# Patient Record
Sex: FEMALE | Race: WHITE | NOT HISPANIC OR LATINO | Employment: FULL TIME | URBAN - METROPOLITAN AREA
[De-identification: names, ages, dates, MRNs, and addresses within clinical notes are randomized per-mention and may not be internally consistent; named-entity substitution may affect disease eponyms.]

---

## 2020-03-15 ENCOUNTER — HOSPITAL ENCOUNTER (EMERGENCY)
Facility: CLINIC | Age: 25
Discharge: HOME | End: 2020-03-15
Attending: EMERGENCY MEDICINE | Admitting: EMERGENCY MEDICINE
Payer: COMMERCIAL

## 2020-03-15 VITALS
HEART RATE: 86 BPM | TEMPERATURE: 97.7 F | SYSTOLIC BLOOD PRESSURE: 107 MMHG | RESPIRATION RATE: 12 BRPM | OXYGEN SATURATION: 97 % | DIASTOLIC BLOOD PRESSURE: 64 MMHG

## 2020-03-15 DIAGNOSIS — F10.921 ALCOHOL INTOXICATION WITH DELIRIUM (H): ICD-10-CM

## 2020-03-15 DIAGNOSIS — R11.2 NON-INTRACTABLE VOMITING WITH NAUSEA, UNSPECIFIED VOMITING TYPE: ICD-10-CM

## 2020-03-15 LAB
ETHANOL SERPL-MCNC: 0.2 G/DL
HCG SERPL QL: NEGATIVE

## 2020-03-15 PROCEDURE — 80320 DRUG SCREEN QUANTALCOHOLS: CPT | Performed by: EMERGENCY MEDICINE

## 2020-03-15 PROCEDURE — 99284 EMERGENCY DEPT VISIT MOD MDM: CPT | Mod: 25

## 2020-03-15 PROCEDURE — 84703 CHORIONIC GONADOTROPIN ASSAY: CPT | Performed by: EMERGENCY MEDICINE

## 2020-03-15 PROCEDURE — 96374 THER/PROPH/DIAG INJ IV PUSH: CPT

## 2020-03-15 PROCEDURE — 25800030 ZZH RX IP 258 OP 636: Performed by: EMERGENCY MEDICINE

## 2020-03-15 PROCEDURE — 96361 HYDRATE IV INFUSION ADD-ON: CPT

## 2020-03-15 PROCEDURE — 25000128 H RX IP 250 OP 636: Performed by: EMERGENCY MEDICINE

## 2020-03-15 RX ORDER — ONDANSETRON 2 MG/ML
4 INJECTION INTRAMUSCULAR; INTRAVENOUS ONCE
Status: COMPLETED | OUTPATIENT
Start: 2020-03-15 | End: 2020-03-15

## 2020-03-15 RX ORDER — ONDANSETRON 4 MG/1
4 TABLET, ORALLY DISINTEGRATING ORAL EVERY 6 HOURS PRN
Qty: 10 TABLET | Refills: 0 | Status: SHIPPED | OUTPATIENT
Start: 2020-03-15 | End: 2020-03-18

## 2020-03-15 RX ADMIN — SODIUM CHLORIDE 1000 ML: 9 INJECTION, SOLUTION INTRAVENOUS at 01:48

## 2020-03-15 RX ADMIN — ONDANSETRON 4 MG: 2 INJECTION INTRAMUSCULAR; INTRAVENOUS at 01:48

## 2020-03-15 NOTE — ED PROVIDER NOTES
"  History     Chief Complaint:  Alcohol intoxication      HPI   Jamil Burrell is a 24 year old female who presents acutely intoxicated. Patient comes by EMS from a private residence where majority of occupants were also intoxicated. Patient had vomited and was unable to care for herself there. Here, patient denies any co-ingestion, but is on Prozac for depression and anxiety. She denies suicidal ideation, any injuries or trauma. Patient states that she has past medical history significant for \"Factor 2\" coagulation disorder.     Allergies:  No known drug allergies     Medications:    Prozac     Past Medical History:    Depression  Anxiety  \"Factor 2\" disorder    Past Surgical History:    History reviewed. No pertinent surgical history.     Family History:    History reviewed. No pertinent family history.      Social History:  Alcohol use: Yes  Patient presents alone.    Review of Systems   Unable to perform ROS: Mental status change     Physical Exam     Patient Vitals for the past 24 hrs:   BP Temp Temp src Pulse Resp SpO2   03/15/20 0337 -- -- -- -- 12 --   03/15/20 0330 107/64 -- -- 86 -- 97 %   03/15/20 0315 101/60 -- -- 81 -- 95 %   03/15/20 0300 98/55 -- -- 77 -- 95 %   03/15/20 0245 96/55 -- -- 78 -- 94 %   03/15/20 0235 -- -- -- -- -- 94 %   03/15/20 0230 98/61 -- -- 81 -- 93 %   03/15/20 0225 -- -- -- -- -- 93 %   03/15/20 0220 -- -- -- -- -- 92 %   03/15/20 0215 105/56 -- -- 76 -- 92 %   03/15/20 0205 -- -- -- -- -- 97 %   03/15/20 0200 114/71 -- -- 71 -- 100 %   03/15/20 0155 -- -- -- -- -- 99 %   03/15/20 0150 -- -- -- -- -- 98 %   03/15/20 0145 111/79 -- -- 106 -- 98 %   03/15/20 0140 119/67 -- -- 87 -- 100 %   03/15/20 0116 168/112 97.7  F (36.5  C) Oral 111 20 96 %     Physical Exam  Nursing note and vitals reviewed.  Constitutional: Intoxicated, slurring her speech.   HENT:   Mouth/Throat: Mucous membranes are dry  Eyes: Pupils are equal, round, and reactive to light. EOMI  Cardiovascular: " Tachycardic rate, regular rhythm and normal heart sounds.  No murmur.  Pulmonary/Chest: Effort normal and breath sounds normal. No respiratory distress. No wheezes.   Abdominal: Soft. Normal appearance and bowel sounds are normal. No distension. There is no tenderness.    Musculoskeletal: Normal range of motion.   Neurological: Alert. GCS 14.  Strength normal.   Skin: Skin is warm and dry. No rash noted.   Psychiatric: Normal mood and affect.      Emergency Department Course     Laboratory:  Laboratory findings were communicated with the patient who voiced understanding of the findings.    EtOH: 0.20    HCG: Negative    Interventions:  0148 - NS 1 L IV Bolus   0148 - Zofran 4 mg IV     Emergency Department Course:  The patient arrived in the emergency department via EMS.     0121: I performed an exam of the patient and obtained history, as documented above. Patient placed on an JEANMARIE.   Past medical records, nursing notes, and vitals reviewed.    IV inserted and blood drawn. This was sent to laboratory for testing, findings above.      0306: I rechecked the patient whose mother is here. Mother is comfortable taking the patient home. Findings and plan explained to the Patient and mother. Patient discharged home with instructions regarding supportive care, medications, and reasons to return. The importance of close follow-up was reviewed.    Impression & Plan     Medical Decision Making:  Jamil Burrell is a 24 year old female who presents from private residence after overindulging in alcohol. She is unable to care for herself. She denies drug use, suicidal ideation, symptoms of psychosis, or injuries. We will monitor her for safety. She is maintaining her airway without difficulty at this time. Plan for reassessment once sober.       Diagnosis:    ICD-10-CM    1. Alcohol intoxication with delirium (H)  F10.921    2. Non-intractable vomiting with nausea, unspecified vomiting type  R11.2      Disposition:  Discharged  to home.    Discharge Medications:   Details   ondansetron (ZOFRAN ODT) 4 MG ODT tab Take 1 tablet (4 mg) by mouth every 6 hours as needed for nausea, Disp-10 tablet,R-0, Local Print        Scribe Disclosure:  I, Clifton Sharma, am serving as a scribe at 1:19 AM on 3/15/2020 to document services personally performed by Josué Garcia MD= based on my observations and the provider's statements to me.      Clifton Sharma   3/15/2020   [facility] EMERGENCY DEPARTMENT     Josué Garcia MD  03/15/20 0632

## 2020-03-15 NOTE — ED AVS SNAPSHOT
[facility] Emergency Department  [address]  [facility] [address]  Phone:  [phone]  Fax:  [phone]                                    Jamil Burrell   MRN: [id number]    Department:  [facility] Emergency Department   Date of Visit:  3/15/2020           After Visit Summary Signature Page    I have received my discharge instructions, and my questions have been answered. I have discussed any challenges I see with this plan with the nurse or doctor.    ..........................................................................................................................................  Patient/Patient Representative Signature      ..........................................................................................................................................  Patient Representative Print Name and Relationship to Patient    ..................................................               ................................................  Date                                   Time    ..........................................................................................................................................  Reviewed by Signature/Title    ...................................................              ..............................................  Date                                               Time          22EPIC Rev 08/18

## 2020-03-15 NOTE — ED TRIAGE NOTES
Alcohol intoxication.  Pt friends called 911 because pt was difficult to rouse.  Pt and friends were poor historians d/t alcohol intoxication.  Pt somnolent and shivering.

## 2024-10-18 ENCOUNTER — HOSPITAL ENCOUNTER (EMERGENCY)
Facility: CLINIC | Age: 29
Discharge: HOME | End: 2024-10-19
Attending: EMERGENCY MEDICINE
Payer: COMMERCIAL

## 2024-10-18 DIAGNOSIS — R07.9 CHEST PAIN, UNSPECIFIED TYPE: ICD-10-CM

## 2024-10-18 DIAGNOSIS — F10.920 ALCOHOLIC INTOXICATION WITHOUT COMPLICATION (H): ICD-10-CM

## 2024-10-18 DIAGNOSIS — R82.90 ABNORMAL FINDING ON URINALYSIS: ICD-10-CM

## 2024-10-18 DIAGNOSIS — E87.6 HYPOKALEMIA: ICD-10-CM

## 2024-10-18 LAB — GLUCOSE BLDC GLUCOMTR-MCNC: 126 MG/DL (ref 70–99)

## 2024-10-18 PROCEDURE — 80048 BASIC METABOLIC PNL TOTAL CA: CPT | Performed by: EMERGENCY MEDICINE

## 2024-10-18 PROCEDURE — 99285 EMERGENCY DEPT VISIT HI MDM: CPT | Mod: 25

## 2024-10-18 PROCEDURE — 85027 COMPLETE CBC AUTOMATED: CPT | Performed by: EMERGENCY MEDICINE

## 2024-10-18 PROCEDURE — 83880 ASSAY OF NATRIURETIC PEPTIDE: CPT | Performed by: EMERGENCY MEDICINE

## 2024-10-18 PROCEDURE — 82962 GLUCOSE BLOOD TEST: CPT

## 2024-10-18 PROCEDURE — 84703 CHORIONIC GONADOTROPIN ASSAY: CPT | Performed by: EMERGENCY MEDICINE

## 2024-10-18 PROCEDURE — 36415 COLL VENOUS BLD VENIPUNCTURE: CPT | Performed by: EMERGENCY MEDICINE

## 2024-10-18 PROCEDURE — 84484 ASSAY OF TROPONIN QUANT: CPT | Performed by: EMERGENCY MEDICINE

## 2024-10-18 PROCEDURE — 82077 ASSAY SPEC XCP UR&BREATH IA: CPT | Performed by: EMERGENCY MEDICINE

## 2024-10-18 PROCEDURE — 85007 BL SMEAR W/DIFF WBC COUNT: CPT | Performed by: EMERGENCY MEDICINE

## 2024-10-18 RX ORDER — DEXTROAMPHETAMINE SACCHARATE, AMPHETAMINE ASPARTATE, DEXTROAMPHETAMINE SULFATE AND AMPHETAMINE SULFATE 1.25; 1.25; 1.25; 1.25 MG/1; MG/1; MG/1; MG/1
TABLET ORAL
COMMUNITY
Start: 2024-07-30

## 2024-10-19 ENCOUNTER — APPOINTMENT (OUTPATIENT)
Dept: CT IMAGING | Facility: CLINIC | Age: 29
End: 2024-10-19
Attending: EMERGENCY MEDICINE
Payer: COMMERCIAL

## 2024-10-19 VITALS
OXYGEN SATURATION: 100 % | TEMPERATURE: 98.5 F | RESPIRATION RATE: 18 BRPM | HEART RATE: 71 BPM | WEIGHT: 130 LBS | DIASTOLIC BLOOD PRESSURE: 79 MMHG | SYSTOLIC BLOOD PRESSURE: 113 MMHG

## 2024-10-19 LAB
ALBUMIN UR-MCNC: NEGATIVE MG/DL
ANION GAP SERPL CALCULATED.3IONS-SCNC: 21 MMOL/L (ref 7–15)
APPEARANCE UR: CLEAR
BACTERIA #/AREA URNS HPF: ABNORMAL /HPF
BASOPHILS # BLD MANUAL: 0 10E3/UL (ref 0–0.2)
BASOPHILS NFR BLD MANUAL: 0 %
BILIRUB UR QL STRIP: NEGATIVE
BUN SERPL-MCNC: 6.6 MG/DL (ref 6–20)
CALCIUM SERPL-MCNC: 8.9 MG/DL (ref 8.8–10.4)
CHLORIDE SERPL-SCNC: 102 MMOL/L (ref 98–107)
COLOR UR AUTO: ABNORMAL
CREAT SERPL-MCNC: 0.72 MG/DL (ref 0.51–0.95)
EGFRCR SERPLBLD CKD-EPI 2021: >90 ML/MIN/1.73M2
EOSINOPHIL # BLD MANUAL: 0 10E3/UL (ref 0–0.7)
EOSINOPHIL NFR BLD MANUAL: 0 %
ERYTHROCYTE [DISTWIDTH] IN BLOOD BY AUTOMATED COUNT: 13.1 % (ref 10–15)
ETHANOL SERPL-MCNC: 0.13 G/DL
GLUCOSE SERPL-MCNC: 119 MG/DL (ref 70–99)
GLUCOSE UR STRIP-MCNC: NEGATIVE MG/DL
HCG SERPL QL: NEGATIVE
HCO3 SERPL-SCNC: 17 MMOL/L (ref 22–29)
HCT VFR BLD AUTO: 41.8 % (ref 35–47)
HGB BLD-MCNC: 13.6 G/DL (ref 11.7–15.7)
HGB UR QL STRIP: NEGATIVE
HOLD SPECIMEN: NORMAL
HYALINE CASTS: 1 /LPF
KETONES UR STRIP-MCNC: NEGATIVE MG/DL
LEUKOCYTE ESTERASE UR QL STRIP: NEGATIVE
LYMPHOCYTES # BLD MANUAL: 5.6 10E3/UL (ref 0.8–5.3)
LYMPHOCYTES NFR BLD MANUAL: 43 %
MCH RBC QN AUTO: 29.4 PG (ref 26.5–33)
MCHC RBC AUTO-ENTMCNC: 32.5 G/DL (ref 31.5–36.5)
MCV RBC AUTO: 90 FL (ref 78–100)
MONOCYTES # BLD MANUAL: 0.1 10E3/UL (ref 0–1.3)
MONOCYTES NFR BLD MANUAL: 1 %
MUCOUS THREADS #/AREA URNS LPF: PRESENT /LPF
NEUTROPHILS # BLD MANUAL: 7.3 10E3/UL (ref 1.6–8.3)
NEUTROPHILS NFR BLD MANUAL: 56 %
NITRATE UR QL: POSITIVE
NT-PROBNP SERPL-MCNC: <36 PG/ML (ref 0–450)
PH UR STRIP: 5 [PH] (ref 5–7)
PLAT MORPH BLD: ABNORMAL
PLAT MORPH BLD: ABNORMAL
PLATELET # BLD AUTO: 497 10E3/UL (ref 150–450)
POTASSIUM SERPL-SCNC: 2.9 MMOL/L (ref 3.4–5.3)
RBC # BLD AUTO: 4.63 10E6/UL (ref 3.8–5.2)
RBC MORPH BLD: ABNORMAL
RBC MORPH BLD: ABNORMAL
RBC URINE: <1 /HPF
SODIUM SERPL-SCNC: 140 MMOL/L (ref 135–145)
SP GR UR STRIP: 1.01 (ref 1–1.03)
SQUAMOUS EPITHELIAL: <1 /HPF
TROPONIN T SERPL HS-MCNC: <6 NG/L
UROBILINOGEN UR STRIP-MCNC: NORMAL MG/DL
VARIANT LYMPHS BLD QL SMEAR: PRESENT
VARIANT LYMPHS BLD QL SMEAR: PRESENT
WBC # BLD AUTO: 13.1 10E3/UL (ref 4–11)
WBC URINE: 2 /HPF

## 2024-10-19 PROCEDURE — 258N000003 HC RX IP 258 OP 636: Performed by: EMERGENCY MEDICINE

## 2024-10-19 PROCEDURE — 81001 URINALYSIS AUTO W/SCOPE: CPT | Performed by: EMERGENCY MEDICINE

## 2024-10-19 PROCEDURE — 71275 CT ANGIOGRAPHY CHEST: CPT

## 2024-10-19 PROCEDURE — 87186 SC STD MICRODIL/AGAR DIL: CPT | Performed by: EMERGENCY MEDICINE

## 2024-10-19 PROCEDURE — 250N000013 HC RX MED GY IP 250 OP 250 PS 637: Performed by: EMERGENCY MEDICINE

## 2024-10-19 PROCEDURE — 250N000011 HC RX IP 250 OP 636: Performed by: EMERGENCY MEDICINE

## 2024-10-19 PROCEDURE — 250N000009 HC RX 250: Performed by: EMERGENCY MEDICINE

## 2024-10-19 RX ORDER — POTASSIUM CHLORIDE 1500 MG/1
40 TABLET, EXTENDED RELEASE ORAL ONCE
Status: COMPLETED | OUTPATIENT
Start: 2024-10-19 | End: 2024-10-19

## 2024-10-19 RX ORDER — POTASSIUM CHLORIDE 1500 MG/1
20 TABLET, EXTENDED RELEASE ORAL 2 TIMES DAILY
Qty: 10 TABLET | Refills: 0 | Status: SHIPPED | OUTPATIENT
Start: 2024-10-19 | End: 2024-10-24

## 2024-10-19 RX ORDER — IOPAMIDOL 755 MG/ML
500 INJECTION, SOLUTION INTRAVASCULAR ONCE
Status: COMPLETED | OUTPATIENT
Start: 2024-10-19 | End: 2024-10-19

## 2024-10-19 RX ADMIN — POTASSIUM CHLORIDE 40 MEQ: 1500 TABLET, EXTENDED RELEASE ORAL at 02:24

## 2024-10-19 RX ADMIN — IOPAMIDOL 52 ML: 755 INJECTION, SOLUTION INTRAVENOUS at 01:46

## 2024-10-19 RX ADMIN — SODIUM CHLORIDE 74 ML: 9 INJECTION, SOLUTION INTRAVENOUS at 01:46

## 2024-10-19 RX ADMIN — SODIUM CHLORIDE 1000 ML: 9 INJECTION, SOLUTION INTRAVENOUS at 00:32

## 2024-10-19 ASSESSMENT — ACTIVITIES OF DAILY LIVING (ADL)
ADLS_ACUITY_SCORE: 35

## 2024-10-19 NOTE — ED NOTES
Ambulated patient to and from the bathroom. Patient able to ambulate by self without assistance. Patient resting in bed placed on monitors.

## 2024-10-19 NOTE — ED TRIAGE NOTES
Patient stated she hasn't eaten for a few days and had 2 drinks today and feels like she wants to pass out. Patient denies any medical history but stated she takes adderall, stated her chest hurts, unsure of what part. Patient c/o chest pain with vomiting.

## 2024-10-19 NOTE — DISCHARGE INSTRUCTIONS

## 2024-10-19 NOTE — ED PROVIDER NOTES
Emergency Department Note      History of Present Illness     Chief Complaint   Alcohol Intoxication    HPI   Jamil Burrell is a 28 year old female with history of DVT who presents to the ED with her ex- for evaluation of alcohol intoxication. The patient's partner reports that he and the patient were out at a bar tonight where the patient consumed two long islands and smoked marijuana afterwards. Following this, she had an onset of a non-burning chest pain, nausea, and vomiting about 45 minutes prior to coming to the ED. She also endorses shortness of breath and leg swelling, but notes the leg swelling is normal. Denies abdominal pain, headache, or vision loss. The patient's partner reports that she did not consume anything else besides her prescription medications or fall/injure herself tonight. Additionally, he adds that she normally only eats once a day and she hasn't eaten for the past 3 days. Patient includes a history of blood clots, but denies blood thinner use.    Independent Historian   Ex- as detailed above.    Review of External Notes   Office visit reviewed from 9/3/2024.  This is an OB visit for insertion of IUD.    Past Medical History     Medical History and Problem List   Antepartum depression  Ovarian cyst  PTSD  Latent tuberculosis  Hereditary deficiency of clotting factors  Prothrombin mutation   Covid-19  Anxiety   DVT of right lower extremity   Adjustment disorder with mixed anxiety and depressed mod  Panic attack   Inflammatory disease of breast  Migraines     Medications   Adderall   Quetiapine  Tizanidine  Heparin sodium   Flexeril   Ativan  Wellbutrin  Topamax   Reglan     Surgical History   Dilation and curettage     Physical Exam     Patient Vitals for the past 24 hrs:   BP Temp Temp src Pulse Resp SpO2 Weight   10/19/24 0136 -- -- -- -- -- -- 59 kg (130 lb)   10/19/24 0032 -- -- -- -- -- 98 % --   10/18/24 2335 115/86 98.5  F (36.9  C) Temporal 114 18 98 % --     10/19  0219 113/79 -- 71 -- --     Physical Exam  General: Sleepy appearing adult female.  Eyes: PERRL, Conjunctive within normal limits.  No scleral icterus.  ENT: Moist mucous membranes, oropharynx clear.   CV: Normal S1S2, no murmur, rub or gallop.  Tachycardic, regular.  Resp: Clear to auscultation bilaterally, no wheezes, rales or rhonchi. Normal respiratory effort.  GI: Abdomen is soft, nontender and nondistended. No palpable masses. No rebound or guarding.  MSK: No edema. Nontender. Normal active range of motion.  Skin: Warm and dry. No rashes or lesions or ecchymoses on visible skin.  Neuro: Alert and oriented. Responds appropriately to all questions and commands. No focal findings appreciated. Normal muscle tone.  Psych: Flat affect.    Diagnostics     Lab Results   Labs Ordered and Resulted from Time of ED Arrival to Time of ED Departure   GLUCOSE BY METER - Abnormal       Result Value    GLUCOSE BY METER POCT 126 (*)    BASIC METABOLIC PANEL - Abnormal    Sodium 140      Potassium 2.9 (*)     Chloride 102      Carbon Dioxide (CO2) 17 (*)     Anion Gap 21 (*)     Urea Nitrogen 6.6      Creatinine 0.72      GFR Estimate >90      Calcium 8.9      Glucose 119 (*)    ETHYL ALCOHOL LEVEL - Abnormal    Alcohol ethyl 0.13 (*)    CBC WITH PLATELETS AND DIFFERENTIAL - Abnormal    WBC Count 13.1 (*)     RBC Count 4.63      Hemoglobin 13.6      Hematocrit 41.8      MCV 90      MCH 29.4      MCHC 32.5      RDW 13.1      Platelet Count 497 (*)    RBC AND PLATELET MORPHOLOGY - Abnormal    RBC Morphology Confirmed RBC Indices      Platelet Assessment        Value: Automated Count Confirmed. Platelet morphology is normal.    Reactive Lymphocytes Present (*)    MANUAL DIFFERENTIAL - Abnormal    % Neutrophils 56      % Lymphocytes 43      % Monocytes 1      % Eosinophils 0      % Basophils 0      Absolute Neutrophils 7.3      Absolute Lymphocytes 5.6 (*)     Absolute Monocytes 0.1      Absolute Eosinophils 0.0      Absolute  Basophils 0.0      RBC Morphology Confirmed RBC Indices      Platelet Assessment        Value: Automated Count Confirmed. Platelet morphology is normal.    Reactive Lymphocytes Present (*)    HCG QUALITATIVE PREGNANCY - Normal    hCG Serum Qualitative Negative     TROPONIN T, HIGH SENSITIVITY - Normal    Troponin T, High Sensitivity <6     NT PROBNP INPATIENT - Normal    N terminal Pro BNP Inpatient <36       Imaging   CT Chest Pulmonary Embolism w Contrast   Final Result   IMPRESSION:   1.  No evidence of pulmonary embolus to the segmental level          Independent Interpretation   None    ED Course      Medications Administered   Medications   potassium chloride delfino ER (KLOR-CON M20) CR tablet 40 mEq (has no administration in time range)   sodium chloride 0.9% BOLUS 1,000 mL (1,000 mLs Intravenous $New Bag 10/19/24 0032)     Procedures   Procedures     Discussion of Management   None    ED Course   ED Course as of 10/19/24 0138   Sat Oct 19, 2024   0014 I obtained history and examined the patient as noted above.    I reassessed the patient.  She is awake and alert.  She denies any new concerns.  She is currently denying any pain.  I discussed findings of today's evaluation and plan for discharge which she is agreeable to.    Additional Documentation  None    Medical Decision Making / Diagnosis     MIPS    CT for PE was ordered because the patient is high risk for pulmonary embolism.    TATUM Burrell is a 28 year old female is a 28-year-old female who presents emergency department with a generalized unwell feeling and chest pain of short duration in the setting of alcohol use and marijuana use.  On arrival she was mildly tachycardic but became normal cardiac with a regular rhythm.  She had no murmur.  She was reporting shortness of breath and chest pain but appeared comfortable.  She was normotensive.  CT chest was obtained given her history of PE but did not show any signs of PE or other acute  pathology.  Laboratory evaluation shows mild hypokalemia which was replaced here with recommendation to increase potassium in her diet and use supplements for the next few days.  She also had nitrite positive urine with no other signs of infection.  She is denying any urinary symptoms.  No indication currently for antibiotics, will await urine culture.  On reassessment she was awake and alert and was denying any symptoms.  I suspect many of her symptoms may have been secondary to ingestion of both alcohol and marijuana.  I do not suspect ACS.  I do not suspect aortic dissection, pneumothorax, or infection.  At this time I feel comfortable discharging her home.  She is recommended to avoid polysubstance use.  Recommend follow-up with her primary doctor within 3 days.  Return precautions discussed.    Disposition   The patient was discharged.     Diagnosis     ICD-10-CM    1. Alcoholic intoxication without complication (H)  F10.920       2. Chest pain, unspecified type  R07.9       3. Hypokalemia  E87.6       4. Abnormal finding on urinalysis  R82.90     nitrite positive, no other significant abnormalities           Discharge Medications   Discharge Medication List as of 10/19/2024  3:31 AM        START taking these medications    Details   potassium chloride ER (K-TAB) 20 MEQ CR tablet Take 1 tablet (20 mEq) by mouth 2 times daily for 5 days., Disp-10 tablet, R-0, E-Prescribe           Scribe Disclosure:  IJosué, am serving as a scribe at 12:19 AM on 10/19/2024 to document services personally performed by Pamela Olmstead MD based on my observations and the provider's statements to me.     Scribe Disclosure:  I, MATT ESPOSITO, am serving as a scribe  at 12:43 AM on 10/19/2024 to document services personally performed by Pamela Olmstead MD based on my observations and the provider's statements to me.      Pamela Olmstead MD  10/20/24 0531

## 2024-10-20 ENCOUNTER — TELEPHONE (OUTPATIENT)
Dept: NURSING | Facility: CLINIC | Age: 29
End: 2024-10-20
Payer: COMMERCIAL

## 2024-10-20 LAB — BACTERIA UR CULT: ABNORMAL

## 2024-10-20 NOTE — LETTER
October 20, 2024        Jamil Burrell  [id number]   [facility] [address]          Dear Jamil Burrell:    You were seen in the [facility] Emergency Department at [facility] on 10/18/2024.  We are unable to reach you by phone, so we are sending you this letter.     It is important that you call [facility] Emergency Department lab result nurse at [phone], as we have information to relay to you AND/OR we MAY have to make some changes in your treatment.    Best time to call back is between 9AM and 5:30PM, 7 days a week.      Sincerely,     [facility] Emergency Department Lab Result RN  [phone]

## 2024-10-20 NOTE — LETTER
October 21, 2024        Jamil Burrell  [id number]   [facility] [address]          Dear Jamil Burrell:    You were seen in the [facility] Emergency Department at [facility] on 10/19/2024.  We are unable to reach you by phone, so we are sending you this letter.     It is important that you call [facility] Emergency Department lab result nurse at [phone], as we have information to relay to you AND/OR we MAY have to make some changes in your treatment.    Best time to call back is between 9AM and 5:30PM, 7 days a week.      Sincerely,     [facility] Emergency Department Lab Result RN  [phone]

## 2024-10-20 NOTE — TELEPHONE ENCOUNTER
[facility]    Reason for call: Lab Result Notification     Lab Result (including Rx patient on, if applicable).  If culture, copy of lab report at bottom.  Lab Result: Urine Culture (PRELIMINARY)  10/18/24 No antibiotic prescribed - (If pt has NKDA's, Nitrofurantoin or Cefpodoxime are Susceptible)     Creatinine Level (mg/dl)   Creatinine   Date Value Ref Range Status   10/18/2024 0.72 0.51 - 0.95 mg/dL Final    Creatinine clearance (ml/min), if applicable    Creatinine clearance cannot be calculated (Unknown ideal weight.)     RN Recommendations/Instructions per [facility] ED lab result protocol:   [facility] ED lab result protocol utilized: Urine Culture    Unable to reach patient/caregiver.   Left voicemail message requesting a call back to [phone] between 9 a.m. and 5:30 p.m. for patient's ED/UC lab results.  Letter pended to be sent via [employer] mail.        Vivienne Moreno RN

## 2024-10-21 NOTE — TELEPHONE ENCOUNTER
[facility]    Reason for call: Lab Result Notification     Lab Result (including Rx patient on, if applicable).  If culture, copy of lab report at bottom.  Lab Result: Urine Culture - See Below    ED Rx: No antibiotic prescribed.     Creatinine Level (mg/dl)   Creatinine   Date Value Ref Range Status   10/18/2024 0.72 0.51 - 0.95 mg/dL Final    Creatinine clearance (ml/min), if applicable    Creatinine clearance cannot be calculated (Unknown ideal weight.)     ED Symptoms: Presented to the ED with alcohol intoxication and chest pain. Positive UA.      Current Symptoms: Unable to assess.     RN Recommendations/Instructions per [facility] ED lab result protocol:   [facility] ED lab result protocol utilized: Urine Culture    Unable to reach patient/caregiver. Unable to leave a message.     Letter pended to be sent via [employer] mail.        CIELO ROD RN